# Patient Record
Sex: MALE | Race: ASIAN
[De-identification: names, ages, dates, MRNs, and addresses within clinical notes are randomized per-mention and may not be internally consistent; named-entity substitution may affect disease eponyms.]

---

## 2019-06-05 ENCOUNTER — HOSPITAL ENCOUNTER (OUTPATIENT)
Dept: HOSPITAL 5 - LAB | Age: 69
Discharge: HOME | End: 2019-06-05
Attending: INTERNAL MEDICINE
Payer: COMMERCIAL

## 2019-06-05 DIAGNOSIS — N18.1: ICD-10-CM

## 2019-06-05 DIAGNOSIS — I12.9: Primary | ICD-10-CM

## 2019-06-05 LAB
ALBUMIN SERPL-MCNC: 4.4 G/DL (ref 3.9–5)
ALT SERPL-CCNC: 26 UNITS/L (ref 7–56)
BILIRUB UR QL STRIP: (no result)
BLOOD UR QL VISUAL: (no result)
BUN SERPL-MCNC: 10 MG/DL (ref 9–20)
BUN/CREAT SERPL: 14 %
CALCIUM SERPL-MCNC: 9.6 MG/DL (ref 8.4–10.2)
CREATININE CLEARANCE URINE: 112
CREATININE,URINE: 35.4 MG/DL (ref 0.1–20)
HEMOLYSIS INDEX: 2
PATIENT HEIGHT,URINE: 66 INCHES
PATIENT WEIGHT,URINE: 174 LBS
PH UR STRIP: 6 [PH] (ref 5–7)
PROTEIN/CREATININE RATIO,URINE: 0.74
RBC #/AREA URNS HPF: 1 /HPF (ref 0–6)
TOTAL VOLUME,URINE: 3500 ML
UROBILINOGEN UR-MCNC: < 2 MG/DL (ref ?–2)
WBC #/AREA URNS HPF: < 1 /HPF (ref 0–6)

## 2019-06-05 PROCEDURE — 36415 COLL VENOUS BLD VENIPUNCTURE: CPT

## 2019-06-05 PROCEDURE — 80053 COMPREHEN METABOLIC PANEL: CPT

## 2019-06-05 PROCEDURE — 81001 URINALYSIS AUTO W/SCOPE: CPT

## 2019-06-05 PROCEDURE — 82570 ASSAY OF URINE CREATININE: CPT

## 2019-06-05 PROCEDURE — 82565 ASSAY OF CREATININE: CPT

## 2019-06-05 PROCEDURE — 82575 CREATININE CLEARANCE TEST: CPT

## 2019-06-05 PROCEDURE — 84156 ASSAY OF PROTEIN URINE: CPT

## 2019-06-12 ENCOUNTER — HOSPITAL ENCOUNTER (OUTPATIENT)
Dept: HOSPITAL 5 - LAB | Age: 69
Discharge: HOME | End: 2019-06-12
Attending: INTERNAL MEDICINE
Payer: COMMERCIAL

## 2019-06-12 DIAGNOSIS — I12.9: Primary | ICD-10-CM

## 2019-06-12 DIAGNOSIS — N18.1: ICD-10-CM

## 2019-06-12 LAB
APTT PPP: (no result) S
BILIRUB UR QL STRIP: (no result)
BLOOD UR QL VISUAL: (no result)
CREATININE,URINE: 82.4 MG/DL (ref 0.1–20)
PH UR STRIP: 6 [PH] (ref 5–7)
PROTEIN/CREATININE RATIO,URINE: 0.8
RBC #/AREA URNS HPF: 1 /HPF (ref 0–6)
UROBILINOGEN UR-MCNC: < 2 MG/DL (ref ?–2)
WBC #/AREA URNS HPF: < 1 /HPF (ref 0–6)

## 2019-06-12 PROCEDURE — 84156 ASSAY OF PROTEIN URINE: CPT

## 2019-06-12 PROCEDURE — 82570 ASSAY OF URINE CREATININE: CPT

## 2019-06-12 PROCEDURE — 81001 URINALYSIS AUTO W/SCOPE: CPT

## 2019-08-14 ENCOUNTER — HOSPITAL ENCOUNTER (OUTPATIENT)
Dept: HOSPITAL 5 - LAB | Age: 69
Discharge: HOME | End: 2019-08-14
Attending: INTERNAL MEDICINE
Payer: COMMERCIAL

## 2019-08-14 DIAGNOSIS — R80.8: ICD-10-CM

## 2019-08-14 DIAGNOSIS — E11.22: Primary | ICD-10-CM

## 2019-08-14 DIAGNOSIS — N18.1: ICD-10-CM

## 2019-08-14 LAB
BILIRUB UR QL STRIP: (no result)
BLOOD UR QL VISUAL: (no result)
BUN SERPL-MCNC: 7 MG/DL (ref 9–20)
BUN/CREAT SERPL: 10 %
CALCIUM SERPL-MCNC: 9.4 MG/DL (ref 8.4–10.2)
CREATININE,URINE: 69.4 MG/DL (ref 0.1–20)
HEMOLYSIS INDEX: 4
MUCOUS THREADS #/AREA URNS HPF: (no result) /HPF
PH UR STRIP: 5 [PH] (ref 5–7)
PROTEIN/CREATININE RATIO,URINE: 0.66
RBC #/AREA URNS HPF: 4 /HPF (ref 0–6)
UROBILINOGEN UR-MCNC: < 2 MG/DL (ref ?–2)
WBC #/AREA URNS HPF: < 1 /HPF (ref 0–6)

## 2019-08-14 PROCEDURE — 82570 ASSAY OF URINE CREATININE: CPT

## 2019-08-14 PROCEDURE — 83036 HEMOGLOBIN GLYCOSYLATED A1C: CPT

## 2019-08-14 PROCEDURE — 81001 URINALYSIS AUTO W/SCOPE: CPT

## 2019-08-14 PROCEDURE — 36415 COLL VENOUS BLD VENIPUNCTURE: CPT

## 2019-08-14 PROCEDURE — 80048 BASIC METABOLIC PNL TOTAL CA: CPT

## 2019-08-14 PROCEDURE — 84156 ASSAY OF PROTEIN URINE: CPT

## 2020-12-10 LAB
ALBUMIN SERPL-MCNC: 4.4 G/DL (ref 3.9–5)
ALT SERPL-CCNC: 24 UNITS/L (ref 7–56)
BUN SERPL-MCNC: 13 MG/DL (ref 9–20)
BUN/CREAT SERPL: 16 %
CALCIUM SERPL-MCNC: 9.4 MG/DL (ref 8.4–10.2)
HCT VFR BLD CALC: 39.2 % (ref 35.5–45.6)
HEMOLYSIS INDEX: 13
HGB BLD-MCNC: 13.5 GM/DL (ref 11.8–15.2)
MCHC RBC AUTO-ENTMCNC: 35 % (ref 32–34)
MCV RBC AUTO: 83 FL (ref 84–94)
PLATELET # BLD: 258 K/MM3 (ref 140–440)
RBC # BLD AUTO: 4.71 M/MM3 (ref 3.65–5.03)

## 2020-12-16 ENCOUNTER — HOSPITAL ENCOUNTER (OUTPATIENT)
Dept: HOSPITAL 5 - OR | Age: 70
Discharge: HOME | End: 2020-12-16
Attending: UROLOGY
Payer: COMMERCIAL

## 2020-12-16 VITALS — SYSTOLIC BLOOD PRESSURE: 132 MMHG | DIASTOLIC BLOOD PRESSURE: 72 MMHG

## 2020-12-16 DIAGNOSIS — Z79.4: ICD-10-CM

## 2020-12-16 DIAGNOSIS — G47.30: ICD-10-CM

## 2020-12-16 DIAGNOSIS — N48.1: Primary | ICD-10-CM

## 2020-12-16 DIAGNOSIS — M19.90: ICD-10-CM

## 2020-12-16 DIAGNOSIS — I10: ICD-10-CM

## 2020-12-16 DIAGNOSIS — Z98.41: ICD-10-CM

## 2020-12-16 DIAGNOSIS — E78.00: ICD-10-CM

## 2020-12-16 DIAGNOSIS — E11.9: ICD-10-CM

## 2020-12-16 DIAGNOSIS — Z98.890: ICD-10-CM

## 2020-12-16 DIAGNOSIS — Z79.899: ICD-10-CM

## 2020-12-16 DIAGNOSIS — Z88.0: ICD-10-CM

## 2020-12-16 DIAGNOSIS — Z20.828: ICD-10-CM

## 2020-12-16 DIAGNOSIS — Z72.89: ICD-10-CM

## 2020-12-16 DIAGNOSIS — Z79.82: ICD-10-CM

## 2020-12-16 DIAGNOSIS — Z98.42: ICD-10-CM

## 2020-12-16 DIAGNOSIS — N40.1: ICD-10-CM

## 2020-12-16 DIAGNOSIS — K21.9: ICD-10-CM

## 2020-12-16 PROCEDURE — 36415 COLL VENOUS BLD VENIPUNCTURE: CPT

## 2020-12-16 PROCEDURE — A6250 SKIN SEAL PROTECT MOISTURIZR: HCPCS

## 2020-12-16 PROCEDURE — 54161 CIRCUM 28 DAYS OR OLDER: CPT

## 2020-12-16 PROCEDURE — 85027 COMPLETE CBC AUTOMATED: CPT

## 2020-12-16 PROCEDURE — U0003 INFECTIOUS AGENT DETECTION BY NUCLEIC ACID (DNA OR RNA); SEVERE ACUTE RESPIRATORY SYNDROME CORONAVIRUS 2 (SARS-COV-2) (CORONAVIRUS DISEASE [COVID-19]), AMPLIFIED PROBE TECHNIQUE, MAKING USE OF HIGH THROUGHPUT TECHNOLOGIES AS DESCRIBED BY CMS-2020-01-R: HCPCS

## 2020-12-16 PROCEDURE — 80053 COMPREHEN METABOLIC PANEL: CPT

## 2020-12-16 PROCEDURE — 82962 GLUCOSE BLOOD TEST: CPT

## 2020-12-16 PROCEDURE — 88304 TISSUE EXAM BY PATHOLOGIST: CPT

## 2020-12-16 RX ADMIN — HYDROMORPHONE HYDROCHLORIDE PRN MG: 1 INJECTION, SOLUTION INTRAMUSCULAR; INTRAVENOUS; SUBCUTANEOUS at 14:25

## 2020-12-16 RX ADMIN — HYDROMORPHONE HYDROCHLORIDE PRN MG: 1 INJECTION, SOLUTION INTRAMUSCULAR; INTRAVENOUS; SUBCUTANEOUS at 13:40

## 2020-12-16 RX ADMIN — HYDROMORPHONE HYDROCHLORIDE PRN MG: 1 INJECTION, SOLUTION INTRAMUSCULAR; INTRAVENOUS; SUBCUTANEOUS at 13:50

## 2020-12-16 RX ADMIN — HYDROMORPHONE HYDROCHLORIDE PRN MG: 1 INJECTION, SOLUTION INTRAMUSCULAR; INTRAVENOUS; SUBCUTANEOUS at 14:42

## 2020-12-16 NOTE — DISCHARGE SUMMARY
Short Stay Discharge Plan


Activity: other (no sex )


Weight Bearing Status: Full Weight Bearing


Diet: low fat, low cholesterol, low salt


Wound: open to air


Special Instructions: other (remove dressing tonight )


Follow up with: 


POOJA CAMPOS MD [Primary Care Provider] - 7 Days


RAYMOND SEGURA MD [Staff Physician] - 14 Days

## 2020-12-16 NOTE — OPERATIVE REPORT
PREOPERATIVE DIAGNOSES:  Severe balanitis and bleeding. 



POSTOPERATIVE DIAGNOSES:  Severe balanitis and bleeding. 



PROCEDURE:  Circumcision, sleeve technique. 



SURGEON:  Dr. Mendoza. 



ANESTHESIA:  General. 



FINDINGS:  This is a gentleman with severe pain and intermittent discomfort and

bleeding from the inner foreskin, now presents for circumcision. 



DESCRIPTION OF PROCEDURE:  The patient was brought to the operating room and

placed on the operating table.  Following induction of anesthesia, placed in

supine position, prepped and draped in usual sterile fashion.  Two

circumferential incisions were made in the penile skin and inner foreskin. 

These were connected dorsally.  The skin was excised.  Hemostasis was assured

with cautery and 3-0 Vicryl ties.  Sutures were placed at 12, 3, 6, and 9

o'clock position.  Each quadrant was sutured with 3-0 chromic.  The patient

tolerated the procedure well.  No significant complications, brought to recovery

room in stable condition.  Minimal blood loss.





DD: 12/16/2020 14:09

DT: 12/16/2020 14:15

JOB# [id number]  [id number]

JOANN/NICKI

## 2020-12-16 NOTE — POST OPERATIVE NOTE
Date of procedure: 12/16/20


Pre-op diagnosis: balanitis 


Post-op diagnosis: same


Findings: 





as abopve


Procedure: 





circ


Anesthesia: GETA


Surgeon: RAYMOND SEGURA


Estimated blood loss: minimal


Pathology: list (foreskin)


Specimen disposition: to lab

## 2020-12-16 NOTE — ANESTHESIA CONSULTATION
Anesthesia Consult and Med Hx


Date of service: 12/16/20





- Airway


Anesthetic Teeth Evaluation: Good


ROM Head & Neck: Adequate


Mental/Hyoid Distance: Adequate


Mallampati Class: Class II


Intubation Access Assessment: Good





- Pre-Operative Health Status


ASA Pre-Surgery Classification: ASA3


Proposed Anesthetic Plan: General





- Pulmonary


Hx Smoking: No


Hx Asthma: No


Hx Respiratory Symptoms: No (+2FS)


COPD: No


Hx Pneumonia: No


Hx Sleep Apnea: Yes (WIL HIGH RISK ON PRESCREEN. Probable per wife)





- Cardiovascular System


Hx Hypertension: Yes (Pt reports negative ETT five years ago)


Hx Heart Attack/AMI: No


Hx Pacemaker: No


Hx Internal Defibrillator: No


Hx Heart Murmur: No





- Central Nervous System


Hx Seizures: No


Hx Back Pain: No


Hx Psychiatric Problems: No





- Gastrointestinal


Hx Gastroesophageal Reflux Disease: Yes





- Endocrine


Hx Renal Disease: No


Hx End Stage Renal Disease: No


Hx Cirrhosis: No


Hx Liver Disease: No





- Hematic


Hx Anemia: No


Hx Sickle Cell Disease: No





- Other Systems


Hx Alcohol Use: Yes (RARE-WINE)


Hx Substance Use: No


Hx Cancer: No


Hx Obesity: No

## 2022-01-06 ENCOUNTER — HOSPITAL ENCOUNTER (OUTPATIENT)
Dept: HOSPITAL 5 - LAB | Age: 72
Discharge: HOME | End: 2022-01-06
Attending: INTERNAL MEDICINE
Payer: COMMERCIAL

## 2022-01-06 DIAGNOSIS — E11.9: ICD-10-CM

## 2022-01-06 DIAGNOSIS — D50.8: ICD-10-CM

## 2022-01-06 DIAGNOSIS — I10: Primary | ICD-10-CM

## 2022-01-06 DIAGNOSIS — E78.2: ICD-10-CM

## 2022-01-06 LAB
ALBUMIN SERPL-MCNC: 4.4 G/DL (ref 3.9–5)
ALT SERPL-CCNC: 23 UNITS/L (ref 7–56)
BILIRUB UR QL STRIP: (no result)
BLOOD UR QL VISUAL: (no result)
BUN SERPL-MCNC: 13 MG/DL (ref 9–20)
BUN/CREAT SERPL: 22 %
CALCIUM SERPL-MCNC: 9.5 MG/DL (ref 8.4–10.2)
CREATININE,URINE: 102.7 MG/DL (ref 0.1–20)
HCT VFR BLD CALC: 43.7 % (ref 35.5–45.6)
HDLC SERPL-MCNC: 50 MG/DL (ref 40–59)
HEMOLYSIS INDEX: 5
HGB BLD-MCNC: 14.4 GM/DL (ref 11.8–15.2)
MCHC RBC AUTO-ENTMCNC: 33 % (ref 32–34)
MCV RBC AUTO: 84 FL (ref 84–94)
MUCOUS THREADS #/AREA URNS HPF: (no result) /HPF
PH UR STRIP: 6 [PH] (ref 5–7)
PLATELET # BLD: 252 K/MM3 (ref 140–440)
RBC # BLD AUTO: 5.2 M/MM3 (ref 3.65–5.03)
RBC #/AREA URNS HPF: < 1 /HPF (ref 0–6)
UROBILINOGEN UR-MCNC: < 2 MG/DL (ref ?–2)
WBC #/AREA URNS HPF: 1 /HPF (ref 0–6)

## 2022-01-06 PROCEDURE — 82607 VITAMIN B-12: CPT

## 2022-01-06 PROCEDURE — 82043 UR ALBUMIN QUANTITATIVE: CPT

## 2022-01-06 PROCEDURE — 85027 COMPLETE CBC AUTOMATED: CPT

## 2022-01-06 PROCEDURE — 81001 URINALYSIS AUTO W/SCOPE: CPT

## 2022-01-06 PROCEDURE — 80061 LIPID PANEL: CPT

## 2022-01-06 PROCEDURE — 83735 ASSAY OF MAGNESIUM: CPT

## 2022-01-06 PROCEDURE — 80053 COMPREHEN METABOLIC PANEL: CPT

## 2022-01-06 PROCEDURE — 86803 HEPATITIS C AB TEST: CPT

## 2022-01-06 PROCEDURE — 36415 COLL VENOUS BLD VENIPUNCTURE: CPT

## 2022-04-21 ENCOUNTER — HOSPITAL ENCOUNTER (OUTPATIENT)
Dept: HOSPITAL 5 - LAB | Age: 72
Discharge: HOME | End: 2022-04-21
Attending: INTERNAL MEDICINE
Payer: COMMERCIAL

## 2022-04-21 DIAGNOSIS — E11.9: Primary | ICD-10-CM

## 2022-04-21 LAB
BUN SERPL-MCNC: 13 MG/DL (ref 9–20)
BUN/CREAT SERPL: 19 %
CALCIUM SERPL-MCNC: 9.6 MG/DL (ref 8.4–10.2)
HEMOLYSIS INDEX: 3

## 2022-04-21 PROCEDURE — 83036 HEMOGLOBIN GLYCOSYLATED A1C: CPT

## 2022-04-21 PROCEDURE — 36415 COLL VENOUS BLD VENIPUNCTURE: CPT

## 2022-04-21 PROCEDURE — 80048 BASIC METABOLIC PNL TOTAL CA: CPT

## 2022-07-14 ENCOUNTER — HOSPITAL ENCOUNTER (OUTPATIENT)
Dept: HOSPITAL 5 - LAB | Age: 72
Discharge: HOME | End: 2022-07-14
Attending: INTERNAL MEDICINE
Payer: COMMERCIAL

## 2022-07-14 DIAGNOSIS — M25.50: ICD-10-CM

## 2022-07-14 DIAGNOSIS — R25.2: ICD-10-CM

## 2022-07-14 DIAGNOSIS — Z79.899: ICD-10-CM

## 2022-07-14 DIAGNOSIS — E11.42: ICD-10-CM

## 2022-07-14 DIAGNOSIS — D50.8: ICD-10-CM

## 2022-07-14 DIAGNOSIS — I10: Primary | ICD-10-CM

## 2022-07-14 DIAGNOSIS — E78.2: ICD-10-CM

## 2022-07-14 LAB
ALBUMIN SERPL-MCNC: 4.9 G/DL (ref 3.9–5)
ALT SERPL-CCNC: 29 UNITS/L (ref 7–56)
BASOPHILS # (AUTO): 0 K/MM3 (ref 0–0.1)
BASOPHILS NFR BLD AUTO: 0.4 % (ref 0–1.8)
BUN SERPL-MCNC: 15 MG/DL (ref 9–20)
BUN/CREAT SERPL: 21 %
CALCIUM SERPL-MCNC: 10.2 MG/DL (ref 8.4–10.2)
CRP SERPL-MCNC: 0.3 MG/DL (ref 0–1.3)
EOSINOPHIL # BLD AUTO: 0.1 K/MM3 (ref 0–0.4)
EOSINOPHIL NFR BLD AUTO: 1.6 % (ref 0–4.3)
HCT VFR BLD CALC: 44.8 % (ref 35.5–45.6)
HDLC SERPL-MCNC: 44 MG/DL (ref 40–59)
HEMOLYSIS INDEX: 8
HGB BLD-MCNC: 14.8 GM/DL (ref 11.8–15.2)
LYMPHOCYTES # BLD AUTO: 1.7 K/MM3 (ref 1.2–5.4)
LYMPHOCYTES NFR BLD AUTO: 21.3 % (ref 13.4–35)
MCHC RBC AUTO-ENTMCNC: 33 % (ref 32–34)
MCV RBC AUTO: 84 FL (ref 84–94)
MONOCYTES # (AUTO): 0.7 K/MM3 (ref 0–0.8)
MONOCYTES % (AUTO): 8.3 % (ref 0–7.3)
PLATELET # BLD: 240 K/MM3 (ref 140–440)
RBC # BLD AUTO: 5.3 M/MM3 (ref 3.65–5.03)

## 2022-07-14 PROCEDURE — 86431 RHEUMATOID FACTOR QUANT: CPT

## 2022-07-14 PROCEDURE — 36415 COLL VENOUS BLD VENIPUNCTURE: CPT

## 2022-07-14 PROCEDURE — 80061 LIPID PANEL: CPT

## 2022-07-14 PROCEDURE — 83735 ASSAY OF MAGNESIUM: CPT

## 2022-07-14 PROCEDURE — 85025 COMPLETE CBC W/AUTO DIFF WBC: CPT

## 2022-07-14 PROCEDURE — 86140 C-REACTIVE PROTEIN: CPT

## 2022-07-14 PROCEDURE — 85652 RBC SED RATE AUTOMATED: CPT

## 2022-07-14 PROCEDURE — 82607 VITAMIN B-12: CPT

## 2022-07-14 PROCEDURE — 80053 COMPREHEN METABOLIC PANEL: CPT

## 2022-07-14 PROCEDURE — 82747 ASSAY OF FOLIC ACID RBC: CPT

## 2022-07-14 PROCEDURE — 86200 CCP ANTIBODY: CPT

## 2022-07-18 ENCOUNTER — HOSPITAL ENCOUNTER (OUTPATIENT)
Dept: HOSPITAL 5 - US | Age: 72
Discharge: HOME | End: 2022-07-18
Attending: INTERNAL MEDICINE
Payer: COMMERCIAL

## 2022-07-18 DIAGNOSIS — E65: ICD-10-CM

## 2022-07-18 DIAGNOSIS — K76.0: Primary | ICD-10-CM

## 2022-07-18 PROCEDURE — 76700 US EXAM ABDOM COMPLETE: CPT

## 2022-07-18 NOTE — ULTRASOUND REPORT
ULTRASOUND ABDOMEN, COMPLETE



INDICATION / CLINICAL INFORMATION: E65. Liver disease.



COMPARISON: None available.



FINDINGS:

PANCREAS: No significant abnormality.

ABDOMINAL AORTA: No significant abnormality.

IVC: No significant abnormality.

LIVER: The liver is normal measuring 14.5 cm with diffusely echogenic appearance. Normal hepatopedal 
blood flow within the main portal vein.

GALLBLADDER: A small amount of sludge is noted in the gallbladder. No evidence of gallstones, wall th
ickening, or pericholecystic fluid. 

BILE DUCTS: No significant abnormality. Common bile duct measures 3 mm. 

KIDNEYS: Right: The right kidney measures 10.9 cm. No significant abnormality.  Left: The left kidney
 measures 10.9 cm. No significant abnormality.

SPLEEN: The spleen measures 9.9 cm. No significant abnormality.



FREE FLUID: None.

ADDITIONAL FINDINGS: None.



IMPRESSION:

1. Diffusely echogenic appearance of the liver, most commonly seen with steatosis.  

2. Small amount of sludge noted in the gallbladder. No evidence of gallstones.



Scribed by: Radha Hancock RDMS, REAGAN, TRACY 

Scribed: 7/18/2022 10:22 AM 



 I have reviewed the images, agree with this report, and edited this report as needed.



Signer Name: Carlos Mast MD 

Signed: 7/18/2022 11:48 AM

Workstation Name: [email or web address]

## 2022-08-10 ENCOUNTER — HOSPITAL ENCOUNTER (OUTPATIENT)
Dept: HOSPITAL 5 - XRAY | Age: 72
Discharge: HOME | End: 2022-08-10
Attending: ORTHOPAEDIC SURGERY
Payer: COMMERCIAL

## 2022-08-10 DIAGNOSIS — M17.0: Primary | ICD-10-CM

## 2022-08-10 PROCEDURE — 73565 X-RAY EXAM OF KNEES: CPT

## 2022-08-10 NOTE — XRAY REPORT
Bilateral knees



INDICATION: Knee pain



FINDINGS: There is tricompartmental degenerative change in bilateral knees with joint space narrowing
 most significant in the medial compartment and patellofemoral joint. No acute fracture or large join
t effusion.



Signer Name: Gino Mauricio MD 

Signed: 8/10/2022 1:31 PM

Workstation Name: [email or web address]

## 2022-08-15 ENCOUNTER — HOSPITAL ENCOUNTER (OUTPATIENT)
Dept: HOSPITAL 5 - XRAY | Age: 72
Discharge: HOME | End: 2022-08-15
Attending: ORTHOPAEDIC SURGERY
Payer: COMMERCIAL

## 2022-08-15 DIAGNOSIS — M25.512: ICD-10-CM

## 2022-08-15 DIAGNOSIS — I70.0: ICD-10-CM

## 2022-08-15 DIAGNOSIS — M43.16: Primary | ICD-10-CM

## 2022-08-15 DIAGNOSIS — M25.78: ICD-10-CM

## 2022-08-15 DIAGNOSIS — M25.511: ICD-10-CM

## 2022-08-15 PROCEDURE — 72110 X-RAY EXAM L-2 SPINE 4/>VWS: CPT

## 2022-08-15 NOTE — XRAY REPORT
LUMBAR SPINE 5 VIEWS



INDICATION:

M54.50



COMPARISON:

No relevant prior imaging study available. 



FINDINGS:



VERTEBRAE: No acute fracture. Normal alignment. 

DISC SPACES: Disc space height is maintained with multilevel mild osteophyte formation.

FACET JOINTS: No significant abnormality. 

SOFT TISSUES: There is mild aortic atherosclerosis. No other significant abnormality.



ADDITIONAL FINDINGS: No additional significant findings. 



IMPRESSION:

Mild lumbar spondylosis.







BILATERAL SHOULDERS 6 VIEWS



INDICATION / CLINICAL INFORMATION:

M25.511



COMPARISON:

None available.

 

FINDINGS:



BONES and JOINT(S): No acute fracture or subluxation. No significant arthritis.

SOFT TISSUES: No significant abnormality.



ADDITIONAL FINDINGS: None.



IMPRESSION:

No significant abnormality of the shoulders.



Signer Name: Rupert Faustin MD 

Signed: 8/15/2022 2:24 PM

Workstation Name: [email or web address]

## 2022-08-15 NOTE — XRAY REPORT
LUMBAR SPINE 5 VIEWS



INDICATION:

M54.50



COMPARISON:

No relevant prior imaging study available. 



FINDINGS:



VERTEBRAE: No acute fracture. Normal alignment. 

DISC SPACES: Disc space height is maintained with multilevel mild osteophyte formation.

FACET JOINTS: No significant abnormality. 

SOFT TISSUES: There is mild aortic atherosclerosis. No other significant abnormality.



ADDITIONAL FINDINGS: No additional significant findings. 



IMPRESSION:

Mild lumbar spondylosis.







BILATERAL SHOULDERS 6 VIEWS



INDICATION / CLINICAL INFORMATION:

M25.511



COMPARISON:

None available.

 

FINDINGS:



BONES and JOINT(S): No acute fracture or subluxation. No significant arthritis.

SOFT TISSUES: No significant abnormality.



ADDITIONAL FINDINGS: None.



IMPRESSION:

No significant abnormality of the shoulders.



Signer Name: Rupert Faustin MD 

Signed: 8/15/2022 2:24 PM

Workstation Name: [email or web address]

## 2022-09-02 ENCOUNTER — HOSPITAL ENCOUNTER (OUTPATIENT)
Dept: HOSPITAL 5 - GIO | Age: 72
Discharge: HOME | End: 2022-09-02
Attending: INTERNAL MEDICINE
Payer: COMMERCIAL

## 2022-09-02 VITALS — DIASTOLIC BLOOD PRESSURE: 77 MMHG | SYSTOLIC BLOOD PRESSURE: 134 MMHG

## 2022-09-02 DIAGNOSIS — Z79.899: ICD-10-CM

## 2022-09-02 DIAGNOSIS — Z12.11: Primary | ICD-10-CM

## 2022-09-02 DIAGNOSIS — K63.89: ICD-10-CM

## 2022-09-02 DIAGNOSIS — Z98.42: ICD-10-CM

## 2022-09-02 DIAGNOSIS — Z98.890: ICD-10-CM

## 2022-09-02 DIAGNOSIS — K29.70: ICD-10-CM

## 2022-09-02 DIAGNOSIS — Z88.8: ICD-10-CM

## 2022-09-02 DIAGNOSIS — K64.8: ICD-10-CM

## 2022-09-02 DIAGNOSIS — Z98.41: ICD-10-CM

## 2022-09-02 DIAGNOSIS — K31.84: ICD-10-CM

## 2022-09-02 DIAGNOSIS — M19.90: ICD-10-CM

## 2022-09-02 DIAGNOSIS — K21.00: ICD-10-CM

## 2022-09-02 DIAGNOSIS — Z72.89: ICD-10-CM

## 2022-09-02 DIAGNOSIS — E11.43: ICD-10-CM

## 2022-09-02 DIAGNOSIS — G47.33: ICD-10-CM

## 2022-09-02 DIAGNOSIS — I10: ICD-10-CM

## 2022-09-02 DIAGNOSIS — E78.00: ICD-10-CM

## 2022-09-02 PROCEDURE — 82962 GLUCOSE BLOOD TEST: CPT

## 2022-09-02 PROCEDURE — 88305 TISSUE EXAM BY PATHOLOGIST: CPT

## 2022-09-02 PROCEDURE — 88342 IMHCHEM/IMCYTCHM 1ST ANTB: CPT

## 2022-09-02 PROCEDURE — 43239 EGD BIOPSY SINGLE/MULTIPLE: CPT

## 2022-09-02 PROCEDURE — 45380 COLONOSCOPY AND BIOPSY: CPT

## 2022-09-02 NOTE — ANESTHESIA CONSULTATION
Anesthesia Consult and Med Hx


Date of service: 09/02/22





- Airway


Anesthetic Teeth Evaluation: Good


ROM Head & Neck: Adequate


Mental/Hyoid Distance: Adequate


Mallampati Class: Class II


Intubation Access Assessment: Probably Good





- Pulmonary Exam


CTA: Yes





- Pre-Operative Health Status


ASA Pre-Surgery Classification: ASA2


Proposed Anesthetic Plan: MAC





- Pulmonary


Hx Smoking: No


Hx Asthma: No


Hx Respiratory Symptoms: No (+2FS)


COPD: No


Hx Pneumonia: No


Hx Sleep Apnea: Yes (WIL HIGH RISK ON PRESCREEN. Probable per wife)





- Cardiovascular System


Hx Hypertension: Yes (Pt reports negative ETT five years ago)


Hx Heart Attack/AMI: No


Hx Pacemaker: No


Hx Internal Defibrillator: No


Hx Heart Murmur: No





- Central Nervous System


Hx Neuromuscular Disorder: No


Hx Seizures: No


Hx Back Pain: No


Hx Psychiatric Problems: No





- Gastrointestinal


Hx Gastroesophageal Reflux Disease: Yes





- Endocrine


Hx Renal Disease: No


Hx End Stage Renal Disease: No


Hx Cirrhosis: No


Hx Liver Disease: No


Hx Non-Insulin Dependent Diabetes: Yes


Hx Thyroid Disease: No





- Hematic


Hx Anemia: No


Hx Sickle Cell Disease: No





- Other Systems


Hx Alcohol Use: Yes (RARE-WINE)


Hx Substance Use: No


Hx Cancer: No


Hx Obesity: No





- Additional Comments


Anesthesia Medical History Comments: Pt. denied previous anethesia complications

## 2022-09-02 NOTE — OPERATIVE REPORT
DATE OF SURGERY: 09/02/2022



PROCEDURE PERFORMED:  Colonoscopy with cold biopsy.



INDICATIONS:  The patient had an EGD done prior to the colonoscopy, which showed

mild to moderate erosive esophagitis and gastric erosion and gastritis as well 

as some mild diabetic gastroparesis.



DESCRIPTION OF PROCEDURE:  Colonoscopy was done after getting informed consent. 

Initial rectal examination was unremarkable.  The instrument was passed through 

the rectum onto the cecum, which was identified by the ileocecal valve and 

appendiceal orifice.  Visualization was good.  Cecum, ascending colon, 

transverse colon, descending colon showed normal mucosa.  There were a few minor

very small colon polyps noted in the rectosigmoid area, possibly hyperplastic in

type that were removed by cold biopsy with minimal bleeding and the rectum 

showed some minor internal hemorrhoid on the retroverted view.



ASSESSMENT:  Colon polyp screening few small rectosigmoid polyps, possibly 

hyperplastic.  No diverticular disease noted.  Minor internal hemorrhoid.



PLAN:  To encourage the patient to avoid aspirin and aspirin-related products 

for the next few days.  Treat the patient with PPI as well as Reglan because of 

the EGD findings of erosive esophagitis, gastric erosion, gastritis and mild 

diabetic gastroparesis and otherwise resume previous medication; have the 

patient follow up in the office in 1-2 weeks' time.  Procedure was done in the 

GI lab with assistance of the GI lab team, which included the GI nurse, the GI 

tech and with assistance of Anesthesia.







DD: 09/02/2022 09:21 AM

DT: 09/02/2022 09:29 AM

TID: [id number] RECEIPT: [id number]

LEELA/ISAAC no

## 2022-09-02 NOTE — PROCEDURE NOTE
Date of procedure: 09/02/22


Pre-op diagnosis: GERD/ Colon POlyp Screening


Post-op diagnosis: other (Mild to Moderate ErosiveEsophagitis/ R/O Eosinophilic 

Esophagitis/ Gastric Erosion and Gastritis (antrum)/ Mild Gastroparesis/ 

Several, Small (possibly Hyperplastic Polyps)/ No Diverticular disease/ 

Minor,Internal Hemorrhoids)


Procedure: 





EGD with Biopsy/ Colonoscopy with Cold biopsy


Anesthesia: [facility]


Surgeon: MAYDA FLORES


Estimated blood loss: minimal


Pathology: list


Specimen disposition: to lab


Condition: stable


Disposition: same day (Treat with PPI and Reglan; avoid aspirin and NSAID for 5 

days, otherwise reume previous medication and F/U in 1 to 2 weeks 

([phone]).)

## 2022-09-02 NOTE — ANESTHESIA DAY OF SURGERY
Anesthesia Day of Surgery





- Day of Surgery


Patient Examined: Yes


Patient H&P Reviewed: Yes


Patient is NPO: Yes


Beta Blockers: No


Cardiac Clearance: No


Pulmonary Clearance: No


Ed's Test: N/A

## 2022-09-03 NOTE — OPERATIVE REPORT
DATE OF SURGERY: 09/02/2022



PROCEDURE PERFORMED:  EGD with biopsy.



INDICATIONS:  This is a 71-year-old gentleman originally from [place] who 

has been having GERD symptoms.  He has an underlying history of diabetes 

mellitus and hypertension, but no history of coronary artery disease.  EGD was 

done to assess for his upper GI symptoms and for any significant upper GI 

pathology.



DESCRIPTION OF PROCEDURE:  Procedure was done after getting informed consent 

with MAC anesthesia.  The instrument was passed through the hypopharynx into the

esophagus, which showed some mild-to-moderate erosive esophagitis.  

Photodocumentation and biopsy was done from the distal esophagus as well as from

the mid esophagus to assess for the severity of the erosive esophagitis and to 

rule out for eosinophilic esophagitis.  Stomach showed antral erosion and 

gastritis, but no ulcers were noted either in the straight or the retroverted 

view.  The pylorus is patent.  There was some evidence diabetic gastroparesis.  

The duodenum in the first and the second portion appeared normal.  Biopsy was 

done from the gastric antrum, gastric body to rule out for H. pylori and 

atrophic gastritis.  Additional biopsy was also done from the angular incisura 

with some minimal bleeding.



ASSESSMENT:  GERD symptoms, gastric erosion, gastritis, most pronounced in the 

antrum, mild to moderate erosive esophagitis, rule out eosinophilic esophagitis 

and mild diabetic gastroparesis.  There was no gastric outlet obstruction noted.



PLAN:  To treat the patient with PPI as well as Reglan.  Have the patient avoid 

aspirin and aspirin-related products for the next few days and to do a 

colonoscopy as part of colon polyp screening.  Have the patient follow up in the

office in 1-2 weeks' time.







DD: 09/02/2022 09:19 AM

DT: 09/02/2022 09:54 AM

TID: [id number] RECEIPT: [id number]

LEELA/MATTHEW

## 2022-09-08 ENCOUNTER — HOSPITAL ENCOUNTER (OUTPATIENT)
Dept: HOSPITAL 5 - MRI | Age: 72
Discharge: HOME | End: 2022-09-08
Attending: ORTHOPAEDIC SURGERY
Payer: COMMERCIAL

## 2022-09-08 DIAGNOSIS — M47.817: Primary | ICD-10-CM

## 2022-09-08 DIAGNOSIS — M51.27: ICD-10-CM

## 2022-09-08 DIAGNOSIS — M19.011: ICD-10-CM

## 2022-09-08 PROCEDURE — 72148 MRI LUMBAR SPINE W/O DYE: CPT

## 2022-09-08 NOTE — MAGNETIC RESONANCE REPORT
MRI RIGHT SHOULDER WITHOUT CONTRAST



INDICATION / CLINICAL INFORMATION: M25.511 PAIN IN RIGHT SHOULDER.



TECHNIQUE: Multiplanar, multisequence MR images were obtained. No contrast used.



COMPARISON: None available.



FINDINGS:

SUPRASPINATUS: Partial-thickness articular sided tear of the anterior distal supraspinatus tendon inv
olving about 50% of the tendon thickness. No retraction. No muscle atrophy.

INFRASPINATUS: No significant abnormality.

SUBSCAPULARIS: No significant abnormality.

BICEPS TENDON, LONG HEAD: No significant abnormality.



GLENOID LABRUM: No significant abnormality.

ARTICULAR CARTILAGE: No significant abnormality.

JOINT SPACE / CAPSULE: No significant abnormality.



ACROMION / A.C. JOINT: Mild AC joint degenerative arthrosis.

SUBACROMIAL/SUBDELTOID SPACE: Trace fluid.



BONES:  No significant bone marrow edema. No fracture. No osseous lesion.



SOFT TISSUES: No significant abnormality.



ADDITIONAL FINDINGS: None.



IMPRESSION:

1. Supraspinatus partial-thickness articular sided tear.



Signer Name: CHAPARRITA Hendrix MD 

Signed: 9/8/2022 1:55 PM

Workstation Name: [email or web address]

## 2022-09-08 NOTE — MAGNETIC RESONANCE REPORT
MRI LUMBAR SPINE 9/8/2022 



INDICATION / CLINICAL INFORMATION:

M54.50 LOW BACK PAIN,UNSPECIFIED.



COMPARISON: 

None available.



FINDINGS:

GENERAL OBSERVATIONS: Unenhanced MR images of the lumbar spine were obtained.



There is no evidence of acute abnormality. Vertebral body height and alignment is well preserved.







LEVEL-BY-LEVEL ANALYSIS: 



L5-S1: Minimal diffuse disc bulging and mild facet degenerative changes.



L4-5: Mild diffuse disc bulging with small dorsal annular fissure. No evidence of disc herniation or 
nerve root compression.



L3-4: Unremarkable.



L2-3: Unremarkable.



L1-2: Unremarkable.





BONE MARROW: No significant abnormality



SPINAL CORD/CAUDA EQUINA:  Normal



PARASPINAL SOFT TISSUES: No significant abnormality.











IMPRESSION:

Minimal degenerative change, with no evidence of stenosis or nerve root compression.



Signer Name: Ed Durham MD 

Signed: 9/8/2022 4:02 PM

Workstation Name: [email or web address]